# Patient Record
Sex: MALE | Race: BLACK OR AFRICAN AMERICAN | NOT HISPANIC OR LATINO | Employment: OTHER | URBAN - METROPOLITAN AREA
[De-identification: names, ages, dates, MRNs, and addresses within clinical notes are randomized per-mention and may not be internally consistent; named-entity substitution may affect disease eponyms.]

---

## 2019-06-20 PROBLEM — I63.9 CEREBROVASCULAR ACCIDENT (CVA): Status: ACTIVE | Noted: 2019-06-20

## 2019-06-20 PROBLEM — I73.9 SMALL VESSEL DISEASE: Status: ACTIVE | Noted: 2019-06-20

## 2019-06-20 PROBLEM — B18.1 CHRONIC VIRAL HEPATITIS B WITHOUT DELTA AGENT AND WITHOUT COMA: Status: ACTIVE | Noted: 2019-02-13

## 2019-06-20 PROBLEM — R47.9 DIFFICULTY SPEAKING: Status: ACTIVE | Noted: 2019-06-20

## 2019-06-20 PROBLEM — R10.13 EPIGASTRIC PAIN: Status: ACTIVE | Noted: 2019-06-20

## 2019-06-24 PROBLEM — R20.0 NUMBNESS AND TINGLING OF BOTH FEET: Status: ACTIVE | Noted: 2019-06-24

## 2019-06-24 PROBLEM — F17.200 SMOKER: Chronic | Status: ACTIVE | Noted: 2019-06-24

## 2019-06-24 PROBLEM — R20.2 NUMBNESS AND TINGLING OF BOTH FEET: Status: ACTIVE | Noted: 2019-06-24

## 2019-09-13 PROBLEM — R22.9 LOCALIZED SUPERFICIAL SWELLING, MASS, OR LUMP: Status: ACTIVE | Noted: 2019-09-13

## 2019-09-13 PROBLEM — R07.9 CHEST PAIN: Status: ACTIVE | Noted: 2019-09-13

## 2019-09-13 PROBLEM — R29.90 STROKE-LIKE SYMPTOMS: Status: ACTIVE | Noted: 2019-09-13

## 2019-09-13 PROBLEM — D50.9 IRON DEFICIENCY ANEMIA: Status: ACTIVE | Noted: 2017-11-27

## 2019-09-13 PROBLEM — E86.0 LUETSCHER'S SYNDROME: Status: ACTIVE | Noted: 2019-09-13

## 2019-09-13 PROBLEM — A04.8 H. PYLORI INFECTION: Status: ACTIVE | Noted: 2018-02-14

## 2019-09-13 PROBLEM — R91.8 LUNG MASS: Status: ACTIVE | Noted: 2019-09-04

## 2019-09-13 PROBLEM — K31.89 PORTAL HYPERTENSIVE GASTROPATHY: Status: ACTIVE | Noted: 2018-01-16

## 2019-09-13 PROBLEM — C34.92 ADENOCARCINOMA OF LEFT LUNG: Status: ACTIVE | Noted: 2019-09-13

## 2019-09-13 PROBLEM — Z01.20 ENCOUNTER FOR DENTAL EXAMINATION: Status: ACTIVE | Noted: 2019-09-13

## 2019-09-13 PROBLEM — R68.89 DIFFICULTY WRITING: Status: ACTIVE | Noted: 2019-09-13

## 2019-09-13 PROBLEM — R53.1 WEAKNESS: Status: ACTIVE | Noted: 2019-09-13

## 2019-09-13 PROBLEM — J06.9 ACUTE UPPER RESPIRATORY INFECTION: Status: ACTIVE | Noted: 2019-09-13

## 2019-09-13 PROBLEM — E78.2 MIXED HYPERLIPIDEMIA: Status: ACTIVE | Noted: 2019-09-13

## 2019-09-13 PROBLEM — R31.9 HEMATURIA: Status: ACTIVE | Noted: 2019-09-13

## 2019-09-13 PROBLEM — K76.6 PORTAL HYPERTENSIVE GASTROPATHY: Status: ACTIVE | Noted: 2018-01-16

## 2019-09-13 PROBLEM — K62.5 RECTAL BLEEDING: Status: ACTIVE | Noted: 2019-09-13

## 2019-09-13 PROBLEM — R55 PRE-SYNCOPE: Status: ACTIVE | Noted: 2017-11-21

## 2019-09-13 PROBLEM — R55 VASOVAGAL RESPONSE: Status: ACTIVE | Noted: 2019-02-13

## 2019-10-07 PROBLEM — C34.90 NON-SMALL CELL LUNG CANCER (NSCLC): Status: ACTIVE | Noted: 2019-09-19

## 2020-01-23 PROBLEM — Z51.12 ENCOUNTER FOR ANTINEOPLASTIC IMMUNOTHERAPY: Status: ACTIVE | Noted: 2020-01-23

## 2020-02-13 PROBLEM — F17.200 SMOKER: Chronic | Status: RESOLVED | Noted: 2019-06-24 | Resolved: 2020-02-13

## 2020-06-19 ENCOUNTER — TELEPHONE (OUTPATIENT)
Dept: PHARMACY | Facility: CLINIC | Age: 65
End: 2020-06-19

## 2020-06-19 NOTE — TELEPHONE ENCOUNTER
LVM for callback to inform patient that [facility] received prescription for Entecavir and benefits investigation is required.  [facility] will be back in touch once insurance determination is received.

## 2020-06-22 ENCOUNTER — TELEPHONE (OUTPATIENT)
Dept: PHARMACY | Facility: CLINIC | Age: 65
End: 2020-06-22

## 2020-06-22 NOTE — TELEPHONE ENCOUNTER
Initial [facility] Medication Consult: Entecavir      Confirmed 2 patient identifiers - name and . Patient received consultation on entecavir over the phone . Entecavir 0.5 mg will be shipped on  to arrive at patient's home on  via [employer]. Patient will start entecavir on . $0 copay. Address confirmed.      Entecavir 0.5 mg- Take one tablet by mouth once DAILY ON AN EMPTY STOMACH.  Counseling was reviewed:              1. Patient MUST take entecavir at the SAME day every day ON AN EMPTY STOMACH.              2. Side effects include, but not limited to: headache, cough, upset stomach/vomiting, stomach pains, fatigue, back pain.              Headache: Patient may treat with OTC remedies. If Tylenol is used, dose should  not exceed 2000mg per day.              3. Medication list reviewed. No DDIs or allergies noted. Patient MUST contact myself or provider prior to starting any new OTC, herbal, or prescription drugs to avoid potential DDIs.      Discussed the importance of staying well hydrated while on therapy. Compliance stressed - patient to take missed doses as soon as remembered, but NOT to take 2 doses in one day. Patient will report questions or concerns to myself or practitioner. Patient verbalizes understanding. Patient plans to start entecavir on . Consultation included: indication; goals of treatment; administration; storage and handling; side effects; how to handle side effects; the importance of compliance; how to handle missed doses; the importance of laboratory monitoring; the importance of keeping all follow up appointments.  Patient understands to report any medication changes to [facility] and provider. All questions answered and addressed to patients satisfaction. I will f/u with her in 1 week from start, [facility] to contact patient in 3 weeks for refills.     Lauren JeffriesD, [facility]  Clinical Pharmacist   [facility]  Phone: [phone]

## 2020-07-09 PROBLEM — R53.1 WEAKNESS: Status: RESOLVED | Noted: 2019-09-13 | Resolved: 2020-07-09

## 2020-07-09 PROBLEM — R20.2 NUMBNESS AND TINGLING OF BOTH FEET: Status: RESOLVED | Noted: 2019-06-24 | Resolved: 2020-07-09

## 2020-07-09 PROBLEM — R07.9 CHEST PAIN: Status: RESOLVED | Noted: 2019-09-13 | Resolved: 2020-07-09

## 2020-07-09 PROBLEM — R55 PRE-SYNCOPE: Status: RESOLVED | Noted: 2017-11-21 | Resolved: 2020-07-09

## 2020-07-09 PROBLEM — R10.13 EPIGASTRIC PAIN: Status: RESOLVED | Noted: 2019-06-20 | Resolved: 2020-07-09

## 2020-07-09 PROBLEM — R55 VASOVAGAL RESPONSE: Status: RESOLVED | Noted: 2019-02-13 | Resolved: 2020-07-09

## 2020-07-09 PROBLEM — R20.0 NUMBNESS AND TINGLING OF BOTH FEET: Status: RESOLVED | Noted: 2019-06-24 | Resolved: 2020-07-09

## 2020-07-09 PROBLEM — K62.5 RECTAL BLEEDING: Status: RESOLVED | Noted: 2019-09-13 | Resolved: 2020-07-09

## 2020-07-09 PROBLEM — R22.9 LOCALIZED SUPERFICIAL SWELLING, MASS, OR LUMP: Status: RESOLVED | Noted: 2019-09-13 | Resolved: 2020-07-09

## 2020-07-15 PROBLEM — K63.5 COLON POLYP: Status: ACTIVE | Noted: 2020-07-15

## 2020-08-17 ENCOUNTER — TELEPHONE (OUTPATIENT)
Dept: PHARMACY | Facility: CLINIC | Age: 65
End: 2020-08-17

## 2020-08-24 ENCOUNTER — TELEPHONE (OUTPATIENT)
Dept: PHARMACY | Facility: CLINIC | Age: 65
End: 2020-08-24

## 2020-08-25 ENCOUNTER — TELEPHONE (OUTPATIENT)
Dept: PHARMACY | Facility: CLINIC | Age: 65
End: 2020-08-25
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
## 2020-09-23 ENCOUNTER — TELEPHONE (OUTPATIENT)
Dept: PHARMACY | Facility: CLINIC | Age: 65
End: 2020-09-23

## 2020-10-23 ENCOUNTER — TELEPHONE (OUTPATIENT)
Dept: PHARMACY | Facility: CLINIC | Age: 65
End: 2020-10-23

## 2020-11-27 ENCOUNTER — PATIENT MESSAGE (OUTPATIENT)
Dept: PHARMACY | Facility: CLINIC | Age: 65
End: 2020-11-27

## 2020-12-03 ENCOUNTER — SPECIALTY PHARMACY (OUTPATIENT)
Dept: PHARMACY | Facility: CLINIC | Age: 65
End: 2020-12-03

## 2020-12-03 PROBLEM — J06.9 ACUTE UPPER RESPIRATORY INFECTION: Status: RESOLVED | Noted: 2019-09-13 | Resolved: 2020-12-03

## 2020-12-03 PROBLEM — R31.9 HEMATURIA: Status: RESOLVED | Noted: 2019-09-13 | Resolved: 2020-12-03

## 2020-12-03 NOTE — TELEPHONE ENCOUNTER
Specialty Pharmacy - Refill Coordination    Specialty Medication Orders Linked to Encounter      Most Recent Value   Medication #1  entecavir (BARACLUDE) 0.5 MG Tab (Order#[id number], Rx#[id number])          Refill Questions - Documented Responses      Most Recent Value   Relationship to patient of person spoken to?  Self   HIPAA/medical authority confirmed?  Yes   Any changes in contact preferences or allowed representatives?  No   Has the patient had any insurance changes?  No   Has the patient had any changes to specialty medication, dose, or instructions?  No   Has the patient started taking any new medications, herbals, or supplements?  No   Has the patient been diagnosed with any new medical conditions?  No   Does the patient have any new allergies to medications or foods?  No   Does the patient have any concerns about side effects?  No   Can the patient store medication/sharps container properly (at the correct temperature, away from children/pets, etc.)?  Yes   Can the patient call emergency services (911) in the event of an emergency?  Yes   Does the patient have any concerns or questions about taking or administering this medication as prescribed?  No   How many doses did the patient miss in the past 4 weeks or since the last fill?  0   How many doses does the patient have on hand?  7   How many days does the patient report on hand quantity will last?  7   Does the number of doses/days supply remaining match pharmacy expected amounts?  Yes   Does the patient feel that this medication is effective?  Yes   How will the patient receive the medication?  Mail   When does the patient need to receive the medication?  12/10/20   Shipping Address  Home   Address in [facility] confirmed and updated if neccessary?  Yes   Expected Copay ($)  0   Is the patient able to afford the medication copay?  Yes   Payment Method  zero copay   Days supply of Refill  30   Would patient like to speak to a pharmacist?  No    Do you want to trigger an intervention?  No   Do you want to trigger an additional referral task?  No   Refill activity completed?  Yes   Refill activity plan  Refill scheduled   Shipment/Pickup Date:  12/08/20          Current Outpatient Medications   Medication Sig    albuterol (PROVENTIL/VENTOLIN HFA) 90 mcg/actuation inhaler Inhale 2 puffs into the lungs every 6 (six) hours as needed for Wheezing. Rescue    albuterol-ipratropium (DUO-NEB) 2.5 mg-0.5 mg/3 mL nebulizer solution Take 3 mLs by nebulization every 6 (six) hours as needed for Wheezing. Rescue    ascorbic acid, vitamin C, (VITAMIN C) 500 MG tablet Take 500 mg by mouth.    aspirin 81 MG Chew Take 1 tablet by mouth.    entecavir (BARACLUDE) 0.5 MG Tab Take 1 tablet (0.5 mg total) by mouth once daily.    famotidine (PEPCID) 20 MG tablet Take 20 mg by mouth once daily.    gabapentin (NEURONTIN) 300 MG capsule Take 1 capsule by mouth every evening.    losartan (COZAAR) 50 MG tablet Take 1 tablet (50 mg total) by mouth before breakfast.    nebulizer and compressor Michelle Use nebulized for breathing treatment Q6hr prn for wheezing    ondansetron (ZOFRAN-ODT) 8 MG TbDL Take 1 tablet (8 mg total) by mouth every 8 (eight) hours as needed.   Last reviewed on 12/3/2020 10:57 AM by Pal Burger    Review of patient's allergies indicates:   Allergen Reactions    Orange juice Rash    Last reviewed on  12/3/2020 10:57 AM by Pal Burger      Tasks added this encounter   No tasks added.   Tasks due within next 3 months   12/12/2020 - Clinical - Follow Up Assesement (180 day)  11/25/2020 - Refill Call     Pal Burger  [facility]  [address] [place] [id number]  Phone: [phone]  Fax: [phone]

## 2020-12-31 ENCOUNTER — SPECIALTY PHARMACY (OUTPATIENT)
Dept: PHARMACY | Facility: CLINIC | Age: 65
End: 2020-12-31

## 2021-01-22 PROBLEM — R23.3 PETECHIAL RASH: Status: ACTIVE | Noted: 2021-01-22

## 2021-02-01 ENCOUNTER — SPECIALTY PHARMACY (OUTPATIENT)
Dept: PHARMACY | Facility: CLINIC | Age: 66
End: 2021-02-01

## 2021-03-02 ENCOUNTER — SPECIALTY PHARMACY (OUTPATIENT)
Dept: PHARMACY | Facility: CLINIC | Age: 66
End: 2021-03-02

## 2021-03-11 ENCOUNTER — PATIENT MESSAGE (OUTPATIENT)
Dept: PHARMACY | Facility: CLINIC | Age: 66
End: 2021-03-11

## 2021-04-16 ENCOUNTER — SPECIALTY PHARMACY (OUTPATIENT)
Dept: PHARMACY | Facility: CLINIC | Age: 66
End: 2021-04-16

## 2021-05-12 ENCOUNTER — PATIENT MESSAGE (OUTPATIENT)
Dept: RESEARCH | Facility: HOSPITAL | Age: 66
End: 2021-05-12

## 2021-05-17 ENCOUNTER — PATIENT MESSAGE (OUTPATIENT)
Dept: PHARMACY | Facility: CLINIC | Age: 66
End: 2021-05-17

## 2021-05-18 ENCOUNTER — SPECIALTY PHARMACY (OUTPATIENT)
Dept: PHARMACY | Facility: CLINIC | Age: 66
End: 2021-05-18

## 2021-06-15 ENCOUNTER — SPECIALTY PHARMACY (OUTPATIENT)
Dept: PHARMACY | Facility: CLINIC | Age: 66
End: 2021-06-15

## 2021-07-07 PROBLEM — R05.9 COUGH: Status: ACTIVE | Noted: 2021-07-07

## 2021-07-07 PROBLEM — E03.9 HYPOTHYROIDISM: Status: ACTIVE | Noted: 2021-07-07

## 2021-07-13 ENCOUNTER — PATIENT MESSAGE (OUTPATIENT)
Dept: PHARMACY | Facility: CLINIC | Age: 66
End: 2021-07-13

## 2021-07-13 ENCOUNTER — SPECIALTY PHARMACY (OUTPATIENT)
Dept: PHARMACY | Facility: CLINIC | Age: 66
End: 2021-07-13

## 2021-07-26 ENCOUNTER — SPECIALTY PHARMACY (OUTPATIENT)
Dept: PHARMACY | Facility: CLINIC | Age: 66
End: 2021-07-26

## 2021-08-21 ENCOUNTER — PATIENT MESSAGE (OUTPATIENT)
Dept: PHARMACY | Facility: CLINIC | Age: 66
End: 2021-08-21

## 2021-08-26 ENCOUNTER — SPECIALTY PHARMACY (OUTPATIENT)
Dept: PHARMACY | Facility: CLINIC | Age: 66
End: 2021-08-26

## 2021-09-23 ENCOUNTER — PATIENT MESSAGE (OUTPATIENT)
Dept: PHARMACY | Facility: CLINIC | Age: 66
End: 2021-09-23

## 2021-09-29 ENCOUNTER — SPECIALTY PHARMACY (OUTPATIENT)
Dept: PHARMACY | Facility: CLINIC | Age: 66
End: 2021-09-29

## 2021-10-23 ENCOUNTER — SPECIALTY PHARMACY (OUTPATIENT)
Dept: PHARMACY | Facility: CLINIC | Age: 66
End: 2021-10-23

## 2021-11-24 ENCOUNTER — SPECIALTY PHARMACY (OUTPATIENT)
Dept: PHARMACY | Facility: CLINIC | Age: 66
End: 2021-11-24

## 2021-12-24 ENCOUNTER — SPECIALTY PHARMACY (OUTPATIENT)
Dept: PHARMACY | Facility: CLINIC | Age: 66
End: 2021-12-24
Payer: MEDICAID

## 2022-01-24 ENCOUNTER — SPECIALTY PHARMACY (OUTPATIENT)
Dept: PHARMACY | Facility: CLINIC | Age: 67
End: 2022-01-24
Payer: MEDICAID

## 2022-01-24 NOTE — TELEPHONE ENCOUNTER
Specialty Pharmacy - Refill Coordination    Specialty Medication Orders Linked to Encounter    Flowsheet Row Most Recent Value   Medication #1 entecavir (BARACLUDE) 0.5 MG Tab (Order#[id number], Rx#[id number])          Refill Questions - Documented Responses    Flowsheet Row Most Recent Value   Patient Availability and HIPAA Verification    Does patient want to proceed with activity? Yes   HIPAA/medical authority confirmed? Yes   Relationship to patient of person spoken to? Self   Refill Screening Questions    Changes to allergies? No   Changes to medications? No   New conditions since last clinic visit? No   Unplanned office visit, urgent care, ED, or hospital admission in the last 4 weeks? No   How does patient/caregiver feel medication is working? Good   Financial problems or insurance changes? No   How many doses of your specialty medications were missed in the last 4 weeks? 0   Would patient like to speak to a pharmacist? No   When does the patient need to receive the medication? 02/02/22   Refill Delivery Questions    How will the patient receive the medication? Mail   When does the patient need to receive the medication? 02/02/22   Shipping Address Home   Address in [facility] confirmed and updated if neccessary? Yes   Expected Copay ($) 0   Is the patient able to afford the medication copay? Yes   Payment Method zero copay   Days supply of Refill 30   Supplies needed? No supplies needed   Refill activity completed? Yes   Refill activity plan Refill scheduled   Shipment/Pickup Date: 02/02/22          Current Outpatient Medications   Medication Sig    albuterol (PROVENTIL/VENTOLIN HFA) 90 mcg/actuation inhaler Inhale 2 puffs into the lungs every 6 (six) hours as needed for Wheezing. Rescue    albuterol-ipratropium (DUO-NEB) 2.5 mg-0.5 mg/3 mL nebulizer solution Take 3 mLs by nebulization every 6 (six) hours as needed for Wheezing. Rescue    ascorbic acid, vitamin C, (VITAMIN C) 500 MG tablet Take 500  mg by mouth.    aspirin 81 MG Chew Take 1 tablet (81 mg total) by mouth once daily.    entecavir (BARACLUDE) 0.5 MG Tab Take 1 tablet (0.5 mg total) by mouth once daily.    entecavir (BARACLUDE) 0.5 MG Tab Take 1 tablet (0.5 mg) by mouth once daily.    levothyroxine (EUTHYROX) 50 MCG tablet Take 1 tablet (50 mcg total) by mouth before breakfast.    losartan (COZAAR) 50 MG tablet Take 1.5 tablets (75 mg total) by mouth before breakfast.    nebulizer and compressor Michelle Use nebulized for breathing treatment Q6hr prn for wheezing    pantoprazole (PROTONIX) 40 MG tablet Take 1 tablet (40 mg total) by mouth once daily.    SUPREP BOWEL PREP KIT 17.5-3.13-1.6 gram SolR Take by mouth.   Last reviewed on 1/19/2022  9:57 AM by Na Mullins NP    Review of patient's allergies indicates:   Allergen Reactions    Orange juice Rash    Last reviewed on  1/19/2022 9:57 AM by Na Mullins      Tasks added this encounter   2/25/2022 - Refill Call (Auto Added)   Tasks due within next 3 months   No tasks due.     [facility]  [address] [place] [id number]  Phone: [phone]  Fax: [phone]

## 2022-02-25 ENCOUNTER — SPECIALTY PHARMACY (OUTPATIENT)
Dept: PHARMACY | Facility: CLINIC | Age: 67
End: 2022-02-25
Payer: MEDICAID

## 2022-02-25 NOTE — TELEPHONE ENCOUNTER
Specialty Pharmacy - Refill Coordination    Specialty Medication Orders Linked to Encounter    Flowsheet Row Most Recent Value   Medication #1 entecavir (BARACLUDE) 0.5 MG Tab (Order#[id number], Rx#[id number])          Refill Questions - Documented Responses    Flowsheet Row Most Recent Value   Patient Availability and HIPAA Verification    Does patient want to proceed with activity? Yes   HIPAA/medical authority confirmed? Yes   Relationship to patient of person spoken to? Self   Refill Screening Questions    Changes to allergies? No   Changes to medications? No   New conditions since last clinic visit? No   Unplanned office visit, urgent care, ED, or hospital admission in the last 4 weeks? No   How does patient/caregiver feel medication is working? Good   Financial problems or insurance changes? No   How many doses of your specialty medications were missed in the last 4 weeks? 0   Would patient like to speak to a pharmacist? No   When does the patient need to receive the medication? 03/04/22   Refill Delivery Questions    How will the patient receive the medication? Mail   When does the patient need to receive the medication? 03/04/22   Shipping Address Home   Address in [facility] confirmed and updated if neccessary? Yes   Expected Copay ($) 0   Is the patient able to afford the medication copay? Yes   Payment Method zero copay   Days supply of Refill 30   Supplies needed? No supplies needed   Refill activity completed? Yes   Refill activity plan Refill scheduled   Shipment/Pickup Date: 03/02/22          Current Outpatient Medications   Medication Sig    albuterol (PROVENTIL/VENTOLIN HFA) 90 mcg/actuation inhaler Inhale 2 puffs into the lungs every 6 (six) hours as needed for Wheezing. Rescue    albuterol-ipratropium (DUO-NEB) 2.5 mg-0.5 mg/3 mL nebulizer solution Take 3 mLs by nebulization every 6 (six) hours as needed for Wheezing. Rescue    ascorbic acid, vitamin C, (VITAMIN C) 500 MG tablet Take 500  mg by mouth.    aspirin 81 MG Chew Take 1 tablet (81 mg total) by mouth once daily.    entecavir (BARACLUDE) 0.5 MG Tab Take 1 tablet (0.5 mg total) by mouth once daily.    entecavir (BARACLUDE) 0.5 MG Tab Take 1 tablet (0.5 mg) by mouth once daily.    levothyroxine (EUTHYROX) 50 MCG tablet Take 1 tablet (50 mcg total) by mouth before breakfast.    losartan (COZAAR) 50 MG tablet Take 1.5 tablets (75 mg total) by mouth before breakfast.    nebulizer and compressor Michelle Use nebulized for breathing treatment Q6hr prn for wheezing    pantoprazole (PROTONIX) 40 MG tablet Take 1 tablet (40 mg total) by mouth once daily.    SUPREP BOWEL PREP KIT 17.5-3.13-1.6 gram SolR Take by mouth.   Last reviewed on 1/19/2022  9:57 AM by Na Mullins NP    Review of patient's allergies indicates:   Allergen Reactions    Orange juice Rash    Last reviewed on  1/19/2022 9:57 AM by Na Mullins      Tasks added this encounter   3/27/2022 - Refill Call (Auto Added)   Tasks due within next 3 months   No tasks due.     [facility]  [address] [place] [id number]  Phone: [phone]  Fax: [phone]

## 2022-03-28 ENCOUNTER — PATIENT MESSAGE (OUTPATIENT)
Dept: PHARMACY | Facility: CLINIC | Age: 67
End: 2022-03-28
Payer: MEDICAID

## 2022-03-28 ENCOUNTER — SPECIALTY PHARMACY (OUTPATIENT)
Dept: PHARMACY | Facility: CLINIC | Age: 67
End: 2022-03-28
Payer: MEDICAID

## 2022-04-05 NOTE — TELEPHONE ENCOUNTER
Specialty Pharmacy - Refill Coordination    Specialty Medication Orders Linked to Encounter    Flowsheet Row Most Recent Value   Medication #1 entecavir (BARACLUDE) 0.5 MG Tab (Order#[id number], Rx#[id number])          Refill Questions - Documented Responses    Flowsheet Row Most Recent Value   Patient Availability and HIPAA Verification    Does patient want to proceed with activity? Yes   HIPAA/medical authority confirmed? Yes   Relationship to patient of person spoken to? Self   Refill Screening Questions    Changes to allergies? No   Changes to medications? No   New conditions since last clinic visit? No   Unplanned office visit, urgent care, ED, or hospital admission in the last 4 weeks? No   How does patient/caregiver feel medication is working? Good   Financial problems or insurance changes? No   How many doses of your specialty medications were missed in the last 4 weeks? 0   Would patient like to speak to a pharmacist? No   When does the patient need to receive the medication? 04/12/22   Refill Delivery Questions    How will the patient receive the medication? Mail   When does the patient need to receive the medication? 04/12/22   Shipping Address Home   Address in [facility] confirmed and updated if neccessary? Yes   Expected Copay ($) 0   Is the patient able to afford the medication copay? Yes   Payment Method zero copay   Days supply of Refill 30   Supplies needed? No supplies needed   Refill activity completed? Yes   Refill activity plan Refill scheduled   Shipment/Pickup Date: 04/07/22          Current Outpatient Medications   Medication Sig    albuterol (PROVENTIL/VENTOLIN HFA) 90 mcg/actuation inhaler Inhale 2 puffs into the lungs every 6 (six) hours as needed for Wheezing. Rescue    albuterol-ipratropium (DUO-NEB) 2.5 mg-0.5 mg/3 mL nebulizer solution Take 3 mLs by nebulization every 6 (six) hours as needed for Wheezing. Rescue    ascorbic acid, vitamin C, (VITAMIN C) 500 MG tablet Take 500  mg by mouth.    aspirin 81 MG Chew Take 1 tablet (81 mg total) by mouth once daily.    clindamycin (CLEOCIN) 300 MG capsule Take 300 mg by mouth 3 (three) times daily.    entecavir (BARACLUDE) 0.5 MG Tab Take 1 tablet (0.5 mg total) by mouth once daily.    entecavir (BARACLUDE) 0.5 MG Tab Take 1 tablet (0.5 mg) by mouth once daily.    levothyroxine (EUTHYROX) 50 MCG tablet Take 1 tablet (50 mcg total) by mouth before breakfast.    losartan (COZAAR) 50 MG tablet Take 1.5 tablets (75 mg total) by mouth before breakfast.    nebulizer and compressor Michelle Use nebulized for breathing treatment Q6hr prn for wheezing    pantoprazole (PROTONIX) 40 MG tablet Take 1 tablet (40 mg total) by mouth once daily.    SUPREP BOWEL PREP KIT 17.5-3.13-1.6 gram SolR Take by mouth.   Last reviewed on 3/24/2022  2:17 PM by Steffanie Pete MA    Review of patient's allergies indicates:   Allergen Reactions    Orange juice Rash    Last reviewed on  3/24/2022 2:16 PM by Steffanie Pete      Tasks added this encounter   5/5/2022 - Refill Call (Auto Added)   Tasks due within next 3 months   No tasks due.     Josi Chaparro, PharmD  [facility]  [address] [place] [id number]  Phone: [phone]  Fax: [phone]

## 2022-04-18 ENCOUNTER — PATIENT MESSAGE (OUTPATIENT)
Dept: ADMINISTRATIVE | Facility: OTHER | Age: 67
End: 2022-04-18
Payer: MEDICAID

## 2022-05-05 ENCOUNTER — SPECIALTY PHARMACY (OUTPATIENT)
Dept: PHARMACY | Facility: CLINIC | Age: 67
End: 2022-05-05
Payer: MEDICAID

## 2022-05-05 ENCOUNTER — PATIENT MESSAGE (OUTPATIENT)
Dept: PHARMACY | Facility: CLINIC | Age: 67
End: 2022-05-05
Payer: MEDICAID

## 2022-05-05 NOTE — TELEPHONE ENCOUNTER
Specialty Pharmacy - Refill Coordination    Specialty Medication Orders Linked to Encounter    Flowsheet Row Most Recent Value   Medication #1 entecavir (BARACLUDE) 0.5 MG Tab (Order#[id number], Rx#[id number])          Refill Questions - Documented Responses    Flowsheet Row Most Recent Value   Patient Availability and HIPAA Verification    Does patient want to proceed with activity? Yes   HIPAA/medical authority confirmed? Yes   Relationship to patient of person spoken to? Self   Refill Screening Questions    Changes to allergies? No   Changes to medications? No   New conditions since last clinic visit? No   Unplanned office visit, urgent care, ED, or hospital admission in the last 4 weeks? No   How does patient/caregiver feel medication is working? Good   Financial problems or insurance changes? No   How many doses of your specialty medications were missed in the last 4 weeks? 0   Would patient like to speak to a pharmacist? No   When does the patient need to receive the medication? 05/15/22   Refill Delivery Questions    How will the patient receive the medication? Mail   When does the patient need to receive the medication? 05/15/22   Shipping Address Home   Address in [facility] confirmed and updated if neccessary? Yes   Expected Copay ($) 0   Is the patient able to afford the medication copay? Yes   Payment Method zero copay   Days supply of Refill 30   Supplies needed? No supplies needed   Refill activity completed? Yes   Refill activity plan Refill scheduled   Shipment/Pickup Date: 05/09/22          Current Outpatient Medications   Medication Sig    albuterol (PROVENTIL/VENTOLIN HFA) 90 mcg/actuation inhaler Inhale 2 puffs into the lungs every 6 (six) hours as needed for Wheezing. Rescue    albuterol-ipratropium (DUO-NEB) 2.5 mg-0.5 mg/3 mL nebulizer solution Take 3 mLs by nebulization every 6 (six) hours as needed for Wheezing. Rescue    ascorbic acid, vitamin C, (VITAMIN C) 500 MG tablet Take 500  mg by mouth.    aspirin 81 MG Chew Take 1 tablet (81 mg total) by mouth once daily.    clindamycin (CLEOCIN) 300 MG capsule Take 300 mg by mouth 3 (three) times daily.    entecavir (BARACLUDE) 0.5 MG Tab Take 1 tablet (0.5 mg total) by mouth once daily.    entecavir (BARACLUDE) 0.5 MG Tab Take 1 tablet (0.5 mg) by mouth once daily.    levothyroxine (EUTHYROX) 50 MCG tablet Take 1 tablet (50 mcg total) by mouth before breakfast.    losartan (COZAAR) 50 MG tablet Take 1.5 tablets (75 mg total) by mouth before breakfast.    nebulizer and compressor Michelle Use nebulized for breathing treatment Q6hr prn for wheezing    pantoprazole (PROTONIX) 40 MG tablet Take 1 tablet (40 mg total) by mouth once daily.    SUPREP BOWEL PREP KIT 17.5-3.13-1.6 gram SolR Take by mouth.   Last reviewed on 3/24/2022  2:17 PM by Steffanie Pete MA    Review of patient's allergies indicates:   Allergen Reactions    Orange juice Rash    Last reviewed on  3/24/2022 2:16 PM by Steffanie Pete      Tasks added this encounter   6/7/2022 - Refill Call (Auto Added)   Tasks due within next 3 months   No tasks due.     Regina Benson, PharmD  [facility]  [address] [place] [id number]  Phone: [phone]  Fax: [phone]

## 2022-06-07 ENCOUNTER — SPECIALTY PHARMACY (OUTPATIENT)
Dept: PHARMACY | Facility: CLINIC | Age: 67
End: 2022-06-07
Payer: MEDICAID

## 2022-06-07 NOTE — TELEPHONE ENCOUNTER
Specialty Pharmacy - Refill Coordination    Specialty Medication Orders Linked to Encounter    Flowsheet Row Most Recent Value   Medication #1 entecavir (BARACLUDE) 0.5 MG Tab (Order#[id number], Rx#[id number])          Refill Questions - Documented Responses    Flowsheet Row Most Recent Value   Patient Availability and HIPAA Verification    Does patient want to proceed with activity? Yes   HIPAA/medical authority confirmed? Yes   Relationship to patient of person spoken to? Self   Refill Screening Questions    Changes to allergies? No   Changes to medications? No   New conditions since last clinic visit? No   Unplanned office visit, urgent care, ED, or hospital admission in the last 4 weeks? No   How does patient/caregiver feel medication is working? Good   Financial problems or insurance changes? No   How many doses of your specialty medications were missed in the last 4 weeks? 0   Would patient like to speak to a pharmacist? No   When does the patient need to receive the medication? 06/12/22   Refill Delivery Questions    How will the patient receive the medication? Mail   When does the patient need to receive the medication? 06/12/22   Shipping Address Home   Address in [facility] confirmed and updated if neccessary? Yes   Expected Copay ($) 0   Is the patient able to afford the medication copay? Yes   Payment Method zero copay   Days supply of Refill 30   Supplies needed? No supplies needed   Refill activity completed? Yes   Refill activity plan Refill scheduled   Shipment/Pickup Date: 06/09/22          Current Outpatient Medications   Medication Sig    albuterol (PROVENTIL/VENTOLIN HFA) 90 mcg/actuation inhaler Inhale 2 puffs into the lungs every 6 (six) hours as needed for Wheezing. Rescue    albuterol-ipratropium (DUO-NEB) 2.5 mg-0.5 mg/3 mL nebulizer solution Take 3 mLs by nebulization every 6 (six) hours as needed for Wheezing. Rescue    ascorbic acid, vitamin C, (VITAMIN C) 500 MG tablet Take 500  mg by mouth.    aspirin 81 MG Chew Take 1 tablet (81 mg total) by mouth once daily.    clindamycin (CLEOCIN) 300 MG capsule Take 300 mg by mouth 3 (three) times daily.    entecavir (BARACLUDE) 0.5 MG Tab Take 1 tablet (0.5 mg total) by mouth once daily.    entecavir (BARACLUDE) 0.5 MG Tab Take 1 tablet (0.5 mg) by mouth once daily.    levothyroxine (EUTHYROX) 50 MCG tablet Take 1 tablet (50 mcg total) by mouth before breakfast.    losartan (COZAAR) 50 MG tablet Take 1.5 tablets (75 mg total) by mouth before breakfast.    nebulizer and compressor Michelle Use nebulized for breathing treatment Q6hr prn for wheezing    pantoprazole (PROTONIX) 40 MG tablet Take 1 tablet (40 mg total) by mouth once daily.    SUPREP BOWEL PREP KIT 17.5-3.13-1.6 gram SolR Take by mouth.   Last reviewed on 6/2/2022  1:03 PM by Sigrid Soler RN    Review of patient's allergies indicates:   Allergen Reactions    Orange juice Rash    Last reviewed on  6/2/2022 1:03 PM by Sigrid Soler      Tasks added this encounter   7/5/2022 - Refill Call (Auto Added)   Tasks due within next 3 months   No tasks due.     Sofi Cannon  [facility]  [address] [place] [id number]  Phone: [phone]  Fax: [phone]

## 2022-07-05 ENCOUNTER — SPECIALTY PHARMACY (OUTPATIENT)
Dept: PHARMACY | Facility: CLINIC | Age: 67
End: 2022-07-05
Payer: MEDICAID

## 2022-07-05 NOTE — TELEPHONE ENCOUNTER
Specialty Pharmacy - Refill Coordination    Specialty Medication Orders Linked to Encounter    Flowsheet Row Most Recent Value   Medication #1 entecavir (BARACLUDE) 0.5 MG Tab (Order#[id number], Rx#[id number])          Refill Questions - Documented Responses    Flowsheet Row Most Recent Value   Patient Availability and HIPAA Verification    Does patient want to proceed with activity? Yes   HIPAA/medical authority confirmed? Yes   Relationship to patient of person spoken to? Self   Refill Screening Questions    Changes to allergies? No   Changes to medications? No   New conditions since last clinic visit? No   Unplanned office visit, urgent care, ED, or hospital admission in the last 4 weeks? No   How does patient/caregiver feel medication is working? Good   Financial problems or insurance changes? No   How many doses of your specialty medications were missed in the last 4 weeks? 0   Would patient like to speak to a pharmacist? No   When does the patient need to receive the medication? 07/12/22   Refill Delivery Questions    How will the patient receive the medication? Mail   When does the patient need to receive the medication? 07/12/22   Shipping Address Home   Address in [facility] confirmed and updated if neccessary? Yes   Expected Copay ($) 0   Is the patient able to afford the medication copay? Yes   Payment Method zero copay   Days supply of Refill 30   Supplies needed? No supplies needed   Refill activity completed? Yes   Refill activity plan Refill scheduled   Shipment/Pickup Date: 07/11/22          Current Outpatient Medications   Medication Sig    albuterol (PROVENTIL/VENTOLIN HFA) 90 mcg/actuation inhaler Inhale 2 puffs into the lungs every 6 (six) hours as needed for Wheezing. Rescue    albuterol-ipratropium (DUO-NEB) 2.5 mg-0.5 mg/3 mL nebulizer solution Take 3 mLs by nebulization every 6 (six) hours as needed for Wheezing. Rescue    ascorbic acid, vitamin C, (VITAMIN C) 500 MG tablet Take 500  mg by mouth.    aspirin 81 MG Chew Take 1 tablet (81 mg total) by mouth once daily.    clindamycin (CLEOCIN) 300 MG capsule Take 300 mg by mouth 3 (three) times daily.    entecavir (BARACLUDE) 0.5 MG Tab Take 1 tablet (0.5 mg total) by mouth once daily.    entecavir (BARACLUDE) 0.5 MG Tab Take 1 tablet (0.5 mg) by mouth once daily.    levothyroxine (EUTHYROX) 50 MCG tablet Take 1 tablet (50 mcg total) by mouth before breakfast.    losartan (COZAAR) 50 MG tablet Take 1.5 tablets (75 mg total) by mouth before breakfast.    nebulizer and compressor Michelle Use nebulized for breathing treatment Q6hr prn for wheezing    pantoprazole (PROTONIX) 40 MG tablet Take 1 tablet (40 mg total) by mouth once daily.    SUPREP BOWEL PREP KIT 17.5-3.13-1.6 gram SolR Take by mouth.   Last reviewed on 6/2/2022  1:03 PM by Sigrid Soler RN    Review of patient's allergies indicates:   Allergen Reactions    Orange juice Rash    Last reviewed on  6/2/2022 1:03 PM by Sigrid Soler      Tasks added this encounter   8/4/2022 - Refill Call (Auto Added)   Tasks due within next 3 months   No tasks due.     Sofi Cannon  [facility] - Specialty Pharmacy  [address] [place] [address]  Phone: [phone]  Fax: [phone]

## 2022-08-04 ENCOUNTER — PATIENT MESSAGE (OUTPATIENT)
Dept: PHARMACY | Facility: CLINIC | Age: 67
End: 2022-08-04
Payer: MEDICAID

## 2022-08-08 ENCOUNTER — SPECIALTY PHARMACY (OUTPATIENT)
Dept: PHARMACY | Facility: CLINIC | Age: 67
End: 2022-08-08
Payer: MEDICAID

## 2022-08-08 NOTE — TELEPHONE ENCOUNTER
Specialty Pharmacy - Refill Coordination    Specialty Medication Orders Linked to Encounter    Flowsheet Row Most Recent Value   Medication #1 entecavir (BARACLUDE) 0.5 MG Tab (Order#[id number], Rx#[id number])        Amlodipine is appropriate to co-administer with Entecavir and other medications. No DDIs.     Refill Questions - Documented Responses    Flowsheet Row Most Recent Value   Patient Availability and HIPAA Verification    Does patient want to proceed with activity? Yes   HIPAA/medical authority confirmed? Yes   Relationship to patient of person spoken to? Self   Refill Screening Questions    Changes to allergies? No   Changes to medications? Yes  [amlodipine 5mg - for hbp]   New conditions since last clinic visit? No   Unplanned office visit, urgent care, ED, or hospital admission in the last 4 weeks? No   How does patient/caregiver feel medication is working? Good   Financial problems or insurance changes? No   How many doses of your specialty medications were missed in the last 4 weeks? 0   Would patient like to speak to a pharmacist? No   When does the patient need to receive the medication? 08/18/22   Refill Delivery Questions    How will the patient receive the medication? Mail   When does the patient need to receive the medication? 08/18/22   Shipping Address Home   Address in [facility] confirmed and updated if neccessary? Yes   Expected Copay ($) 0   Is the patient able to afford the medication copay? Yes   Payment Method zero copay   Days supply of Refill 30   Supplies needed? No supplies needed   Refill activity completed? Yes   Refill activity plan Refill scheduled   Shipment/Pickup Date: 08/09/22          Current Outpatient Medications   Medication Sig    albuterol (PROVENTIL/VENTOLIN HFA) 90 mcg/actuation inhaler Inhale 2 puffs into the lungs every 6 (six) hours as needed for Wheezing. Rescue    albuterol-ipratropium (DUO-NEB) 2.5 mg-0.5 mg/3 mL nebulizer solution Take 3 mLs by  nebulization every 6 (six) hours as needed for Wheezing. Rescue    amLODIPine (NORVASC) 5 MG tablet Take 1 tablet (5 mg total) by mouth once daily.    ascorbic acid, vitamin C, (VITAMIN C) 500 MG tablet Take 500 mg by mouth.    aspirin 81 MG Chew Take 1 tablet (81 mg total) by mouth once daily.    entecavir (BARACLUDE) 0.5 MG Tab Take 1 tablet (0.5 mg) by mouth once daily.    levothyroxine (EUTHYROX) 50 MCG tablet Take 1 tablet (50 mcg total) by mouth before breakfast.    nebulizer and compressor Michelle Use nebulized for breathing treatment Q6hr prn for wheezing    pantoprazole (PROTONIX) 40 MG tablet Take 1 tablet (40 mg total) by mouth once daily.   Last reviewed on 8/4/2022  1:08 PM by Chidi Yoon MD    Review of patient's allergies indicates:   Allergen Reactions    Orange juice Rash    Last reviewed on  8/4/2022 1:08 PM by Chidi Yoon      Tasks added this encounter   9/10/2022 - Refill Call (Auto Added)   Tasks due within next 3 months   No tasks due.     Sera Harp, PharmD  [facility] - Specialty Pharmacy  [address] [place] [address]  Phone: [phone]  Fax: [phone]

## 2022-08-08 NOTE — TELEPHONE ENCOUNTER
Outgoing call: Spoke with patient regarding refill, he has 8 on hand. Patient stated he started a new medication (Amlodipine 5mg) for HBP. Transferring to [facility].

## 2022-09-12 ENCOUNTER — PATIENT MESSAGE (OUTPATIENT)
Dept: PHARMACY | Facility: CLINIC | Age: 67
End: 2022-09-12
Payer: MEDICAID

## 2022-09-15 ENCOUNTER — SPECIALTY PHARMACY (OUTPATIENT)
Dept: PHARMACY | Facility: CLINIC | Age: 67
End: 2022-09-15
Payer: MEDICAID

## 2022-09-15 DIAGNOSIS — B18.1 CHRONIC VIRAL HEPATITIS B WITHOUT DELTA AGENT AND WITHOUT COMA: Primary | ICD-10-CM

## 2022-09-15 NOTE — TELEPHONE ENCOUNTER
Specialty Pharmacy - Refill Coordination    Specialty Medication Orders Linked to Encounter      Flowsheet Row Most Recent Value   Medication #1 entecavir (BARACLUDE) 0.5 MG Tab (Order#[id number], Rx#[id number])            Refill Questions - Documented Responses      Flowsheet Row Most Recent Value   Patient Availability and HIPAA Verification    Does patient want to proceed with activity? Yes   HIPAA/medical authority confirmed? Yes   Relationship to patient of person spoken to? Self   Refill Screening Questions    Changes to allergies? No   Changes to medications? No   New conditions since last clinic visit? No   Unplanned office visit, urgent care, ED, or hospital admission in the last 4 weeks? No   How does patient/caregiver feel medication is working? Good   Financial problems or insurance changes? No   How many doses of your specialty medications were missed in the last 4 weeks? 0   Would patient like to speak to a pharmacist? No   When does the patient need to receive the medication? 09/18/22   Refill Delivery Questions    How will the patient receive the medication? Mail   When does the patient need to receive the medication? 09/18/22   Shipping Address Home   Address in [facility] confirmed and updated if neccessary? Yes   Expected Copay ($) 0   Is the patient able to afford the medication copay? Yes   Payment Method zero copay   Days supply of Refill 30   Supplies needed? No supplies needed   Refill activity completed? Yes   Refill activity plan Refill scheduled   Shipment/Pickup Date: 09/15/22            Current Outpatient Medications   Medication Sig    albuterol (PROVENTIL/VENTOLIN HFA) 90 mcg/actuation inhaler Inhale 2 puffs into the lungs every 6 (six) hours as needed for Wheezing. Rescue    albuterol-ipratropium (DUO-NEB) 2.5 mg-0.5 mg/3 mL nebulizer solution Take 3 mLs by nebulization every 6 (six) hours as needed for Wheezing. Rescue    amLODIPine (NORVASC) 5 MG tablet Take 1 tablet (5 mg  total) by mouth once daily.    ascorbic acid, vitamin C, (VITAMIN C) 500 MG tablet Take 500 mg by mouth.    aspirin 81 MG Chew Take 1 tablet (81 mg total) by mouth once daily.    entecavir (BARACLUDE) 0.5 MG Tab Take 1 tablet (0.5 mg) by mouth once daily.    levothyroxine (EUTHYROX) 50 MCG tablet Take 1 tablet (50 mcg total) by mouth before breakfast.    nebulizer and compressor Michelle Use nebulized for breathing treatment Q6hr prn for wheezing    pantoprazole (PROTONIX) 40 MG tablet Take 1 tablet (40 mg total) by mouth once daily.   Last reviewed on 9/1/2022  2:24 PM by Chidi Yoon MD    Review of patient's allergies indicates:   Allergen Reactions    Orange juice Rash    Last reviewed on  9/1/2022 2:24 PM by Chidi Yoon      Tasks added this encounter   10/11/2022 - Refill Call (Auto Added)   Tasks due within next 3 months   No tasks due.     Joi Edwards, PharmD  [facility]  [address] [place] [address]  Phone: [phone]  Fax: [phone]

## 2022-10-11 ENCOUNTER — SPECIALTY PHARMACY (OUTPATIENT)
Dept: PHARMACY | Facility: CLINIC | Age: 67
End: 2022-10-11
Payer: MEDICAID

## 2022-10-11 NOTE — TELEPHONE ENCOUNTER
Specialty Pharmacy - Refill Coordination    Specialty Medication Orders Linked to Encounter      Flowsheet Row Most Recent Value   Medication #1 entecavir (BARACLUDE) 0.5 MG Tab (Order#[id number], Rx#[id number])            Refill Questions - Documented Responses      Flowsheet Row Most Recent Value   Patient Availability and HIPAA Verification    Does patient want to proceed with activity? Yes   HIPAA/medical authority confirmed? Yes   Relationship to patient of person spoken to? Self   Refill Screening Questions    Changes to allergies? No   Changes to medications? No   New conditions since last clinic visit? No   Unplanned office visit, urgent care, ED, or hospital admission in the last 4 weeks? No   How does patient/caregiver feel medication is working? Good   Financial problems or insurance changes? No   Would patient like to speak to a pharmacist? No   When does the patient need to receive the medication? 10/16/22   Refill Delivery Questions    How will the patient receive the medication? Mail   When does the patient need to receive the medication? 10/16/22   Shipping Address Home   Address in [facility] confirmed and updated if neccessary? Yes   Expected Copay ($) 0   Is the patient able to afford the medication copay? Yes   Payment Method zero copay   Days supply of Refill 30   Supplies needed? No supplies needed   Refill activity completed? Yes   Refill activity plan Refill scheduled   Shipment/Pickup Date: 10/13/22            Current Outpatient Medications   Medication Sig    albuterol (PROVENTIL/VENTOLIN HFA) 90 mcg/actuation inhaler Inhale 2 puffs into the lungs every 6 (six) hours as needed for Wheezing. Rescue    albuterol-ipratropium (DUO-NEB) 2.5 mg-0.5 mg/3 mL nebulizer solution Take 3 mLs by nebulization every 6 (six) hours as needed for Wheezing. Rescue    amLODIPine (NORVASC) 5 MG tablet Take 1 tablet (5 mg total) by mouth once daily.    ascorbic acid, vitamin C, (VITAMIN C) 500 MG tablet  Take 500 mg by mouth.    aspirin 81 MG Chew Take 1 tablet (81 mg total) by mouth once daily.    entecavir (BARACLUDE) 0.5 MG Tab Take 1 tablet (0.5 mg) by mouth once daily.    levothyroxine (EUTHYROX) 50 MCG tablet Take 1 tablet (50 mcg total) by mouth before breakfast.    nebulizer and compressor Michelle Use nebulized for breathing treatment Q6hr prn for wheezing    pantoprazole (PROTONIX) 40 MG tablet Take 1 tablet (40 mg total) by mouth once daily.   Last reviewed on 9/15/2022  2:08 PM by Chidi Yoon MD    Review of patient's allergies indicates:   Allergen Reactions    Orange juice Rash    Last reviewed on  9/15/2022 2:08 PM by Chidi Yoon      Tasks added this encounter   11/8/2022 - Refill Call (Auto Added)   Tasks due within next 3 months   No tasks due.     [facility]ty [facility]  [address] [facility] [address]  Phone: [phone]  Fax: [phone]

## 2022-11-09 ENCOUNTER — SPECIALTY PHARMACY (OUTPATIENT)
Dept: PHARMACY | Facility: CLINIC | Age: 67
End: 2022-11-09
Payer: MEDICAID

## 2022-11-09 NOTE — TELEPHONE ENCOUNTER
Specialty Pharmacy - Refill Coordination    Specialty Medication Orders Linked to Encounter      Flowsheet Row Most Recent Value   Medication #1 entecavir (BARACLUDE) 0.5 MG Tab (Order#[id number], Rx#[id number])            Refill Questions - Documented Responses      Flowsheet Row Most Recent Value   Patient Availability and HIPAA Verification    Does patient want to proceed with activity? Yes   HIPAA/medical authority confirmed? Yes   Relationship to patient of person spoken to? Self   Refill Screening Questions    Changes to allergies? No   Changes to medications? No   New conditions since last clinic visit? No   Unplanned office visit, urgent care, ED, or hospital admission in the last 4 weeks? No   How does patient/caregiver feel medication is working? Good   Financial problems or insurance changes? No   How many doses of your specialty medications were missed in the last 4 weeks? 0   Would patient like to speak to a pharmacist? No   When does the patient need to receive the medication? 11/15/22   Refill Delivery Questions    How will the patient receive the medication? Mail   When does the patient need to receive the medication? 11/15/22   Shipping Address Home   Address in [facility] confirmed and updated if neccessary? Yes   Expected Copay ($) 0   Is the patient able to afford the medication copay? Yes   Payment Method zero copay   Days supply of Refill 30   Supplies needed? No supplies needed   Refill activity completed? Yes   Refill activity plan Refill scheduled   Shipment/Pickup Date: 11/14/22            Current Outpatient Medications   Medication Sig    albuterol (PROVENTIL/VENTOLIN HFA) 90 mcg/actuation inhaler Inhale 2 puffs into the lungs every 6 (six) hours as needed for Wheezing. Rescue    albuterol-ipratropium (DUO-NEB) 2.5 mg-0.5 mg/3 mL nebulizer solution Take 3 mLs by nebulization every 6 (six) hours as needed for Wheezing. Rescue    amLODIPine (NORVASC) 5 MG tablet Take 1 tablet (5 mg  total) by mouth once daily.    ascorbic acid, vitamin C, (VITAMIN C) 500 MG tablet Take 500 mg by mouth.    aspirin 81 MG Chew Take 1 tablet (81 mg total) by mouth once daily.    entecavir (BARACLUDE) 0.5 MG Tab Take 1 tablet (0.5 mg) by mouth once daily.    levothyroxine (EUTHYROX) 50 MCG tablet Take 1 tablet (50 mcg total) by mouth before breakfast.    nebulizer and compressor Michelle Use nebulized for breathing treatment Q6hr prn for wheezing    pantoprazole (PROTONIX) 40 MG tablet Take 1 tablet (40 mg total) by mouth once daily.   Last reviewed on 9/15/2022  2:08 PM by Chidi Yoon MD    Review of patient's allergies indicates:   Allergen Reactions    Orange juice Rash    Last reviewed on  10/17/2022 7:43 AM by Yelena Chappell      Tasks added this encounter   12/8/2022 - Refill Call (Auto Added)   Tasks due within next 3 months   No tasks due.     [facility] - Specialty Pharmacy  [address] [facility] [address]  Phone: [phone]  Fax: [phone]

## 2022-12-07 ENCOUNTER — PATIENT MESSAGE (OUTPATIENT)
Dept: PHARMACY | Facility: CLINIC | Age: 67
End: 2022-12-07
Payer: MEDICAID

## 2022-12-13 ENCOUNTER — SPECIALTY PHARMACY (OUTPATIENT)
Dept: PHARMACY | Facility: CLINIC | Age: 67
End: 2022-12-13
Payer: MEDICAID

## 2022-12-13 NOTE — TELEPHONE ENCOUNTER
Specialty Pharmacy - Refill Coordination    Specialty Medication Orders Linked to Encounter      Flowsheet Row Most Recent Value   Medication #1 entecavir (BARACLUDE) 0.5 MG Tab (Order#[id number], Rx#[id number])            Refill Questions - Documented Responses      Flowsheet Row Most Recent Value   Patient Availability and HIPAA Verification    Does patient want to proceed with activity? Yes   HIPAA/medical authority confirmed? Yes   Relationship to patient of person spoken to? Self   Refill Screening Questions    Changes to allergies? No   Changes to medications? No   New conditions since last clinic visit? No   Unplanned office visit, urgent care, ED, or hospital admission in the last 4 weeks? No   How does patient/caregiver feel medication is working? Good   Financial problems or insurance changes? No   How many doses of your specialty medications were missed in the last 4 weeks? 0   Would patient like to speak to a pharmacist? No   When does the patient need to receive the medication? 12/19/22   Refill Delivery Questions    How will the patient receive the medication? Mail   When does the patient need to receive the medication? 12/19/22   Shipping Address Home   Address in [facility] confirmed and updated if neccessary? Yes   Expected Copay ($) 0   Is the patient able to afford the medication copay? Yes   Payment Method zero copay   Days supply of Refill 30   Refill activity completed? Yes   Refill activity plan Refill scheduled   Shipment/Pickup Date: 12/15/22            Current Outpatient Medications   Medication Sig    albuterol (PROVENTIL/VENTOLIN HFA) 90 mcg/actuation inhaler Inhale 2 puffs into the lungs every 6 (six) hours as needed for Wheezing. Rescue    albuterol-ipratropium (DUO-NEB) 2.5 mg-0.5 mg/3 mL nebulizer solution Take 3 mLs by nebulization every 6 (six) hours as needed for Wheezing. Rescue    amLODIPine (NORVASC) 10 MG tablet Take 1 tablet (10 mg total) by mouth once daily.     ascorbic acid, vitamin C, (VITAMIN C) 500 MG tablet Take 500 mg by mouth.    aspirin 81 MG Chew Take 1 tablet (81 mg total) by mouth once daily.    entecavir (BARACLUDE) 0.5 MG Tab Take 1 tablet (0.5 mg) by mouth once daily.    levothyroxine (EUTHYROX) 50 MCG tablet Take 1 tablet (50 mcg total) by mouth before breakfast.    nebulizer and compressor Michelle Use nebulized for breathing treatment Q6hr prn for wheezing    pantoprazole (PROTONIX) 40 MG tablet Take 1 tablet (40 mg total) by mouth once daily.   Last reviewed on 12/8/2022 11:06 AM by Sigrid Soler RN    Review of patient's allergies indicates:   Allergen Reactions    Orange juice Rash    Last reviewed on  12/8/2022 11:06 AM by Sigrid Soler      Tasks added this encounter   1/11/2023 - Refill Call (Auto Added)   Tasks due within next 3 months   No tasks due.     [facility]  [address] [place] [address]  Phone: [phone]  Fax: [phone]

## 2023-01-11 ENCOUNTER — SPECIALTY PHARMACY (OUTPATIENT)
Dept: PHARMACY | Facility: CLINIC | Age: 68
End: 2023-01-11
Payer: MEDICAID

## 2023-01-11 NOTE — TELEPHONE ENCOUNTER
Specialty Pharmacy - Refill Coordination    Specialty Medication Orders Linked to Encounter      Flowsheet Row Most Recent Value   Medication #1 entecavir (BARACLUDE) 0.5 MG Tab (Order#[id number], Rx#[id number])            Refill Questions - Documented Responses      Flowsheet Row Most Recent Value   Patient Availability and HIPAA Verification    Does patient want to proceed with activity? Yes   HIPAA/medical authority confirmed? Yes   Relationship to patient of person spoken to? Self   Refill Screening Questions    Changes to allergies? No   Changes to medications? No   New conditions since last clinic visit? No   Unplanned office visit, urgent care, ED, or hospital admission in the last 4 weeks? No   How does patient/caregiver feel medication is working? Good   Financial problems or insurance changes? No   How many doses of your specialty medications were missed in the last 4 weeks? 0   Would patient like to speak to a pharmacist? No   When does the patient need to receive the medication? 01/18/23   Refill Delivery Questions    How will the patient receive the medication? Mail   When does the patient need to receive the medication? 01/18/23   Shipping Address Home   Address in [facility] confirmed and updated if neccessary? Yes   Expected Copay ($) 0   Is the patient able to afford the medication copay? Yes   Payment Method zero copay   Days supply of Refill 30   Refill activity completed? Yes   Refill activity plan Refill scheduled   Shipment/Pickup Date: 01/17/23            Current Outpatient Medications   Medication Sig    albuterol (PROVENTIL/VENTOLIN HFA) 90 mcg/actuation inhaler Inhale 2 puffs into the lungs every 6 (six) hours as needed for Wheezing. Rescue    albuterol-ipratropium (DUO-NEB) 2.5 mg-0.5 mg/3 mL nebulizer solution Take 3 mLs by nebulization every 6 (six) hours as needed for Wheezing. Rescue    amLODIPine (NORVASC) 10 MG tablet Take 1 tablet (10 mg total) by mouth once daily.     ascorbic acid, vitamin C, (VITAMIN C) 500 MG tablet Take 500 mg by mouth.    aspirin 81 MG Chew Take 1 tablet (81 mg total) by mouth once daily.    entecavir (BARACLUDE) 0.5 MG Tab Take 1 tablet (0.5 mg) by mouth once daily.    levothyroxine (EUTHYROX) 50 MCG tablet Take 1 tablet (50 mcg total) by mouth before breakfast.    nebulizer and compressor Michelle Use nebulized for breathing treatment Q6hr prn for wheezing    pantoprazole (PROTONIX) 40 MG tablet Take 1 tablet (40 mg total) by mouth once daily.   Last reviewed on 12/8/2022 11:06 AM by Sigrid Soler RN    Review of patient's allergies indicates:   Allergen Reactions    Orange juice Rash    Last reviewed on  12/8/2022 11:06 AM by Sigrid Soler      Tasks added this encounter   2/10/2023 - Refill Call (Auto Added)   Tasks due within next 3 months   No tasks due.     [facility]  [address] [place] [address]  Phone: [phone]  Fax: [phone]

## 2023-02-10 ENCOUNTER — SPECIALTY PHARMACY (OUTPATIENT)
Dept: PHARMACY | Facility: CLINIC | Age: 68
End: 2023-02-10
Payer: MEDICAID

## 2023-02-10 NOTE — TELEPHONE ENCOUNTER
Specialty Pharmacy - Refill Coordination    Specialty Medication Orders Linked to Encounter      Flowsheet Row Most Recent Value   Medication #1 entecavir (BARACLUDE) 0.5 MG Tab (Order#[id number], Rx#[id number])            Refill Questions - Documented Responses      Flowsheet Row Most Recent Value   Patient Availability and HIPAA Verification    Does patient want to proceed with activity? Yes   HIPAA/medical authority confirmed? Yes   Relationship to patient of person spoken to? Self   Refill Screening Questions    Changes to allergies? No   Changes to medications? No   New conditions since last clinic visit? No   Unplanned office visit, urgent care, ED, or hospital admission in the last 4 weeks? No   How does patient/caregiver feel medication is working? Good   Financial problems or insurance changes? No   How many doses of your specialty medications were missed in the last 4 weeks? 0   Would patient like to speak to a pharmacist? No   When does the patient need to receive the medication? 02/17/23   Refill Delivery Questions    How will the patient receive the medication? Mail   When does the patient need to receive the medication? 02/17/23   Shipping Address Home   Address in [facility] confirmed and updated if neccessary? Yes   Expected Copay ($) 0   Is the patient able to afford the medication copay? Yes   Payment Method zero copay   Days supply of Refill 30   Supplies needed? No supplies needed   Refill activity completed? Yes   Refill activity plan Refill scheduled   Shipment/Pickup Date: 02/13/23            Current Outpatient Medications   Medication Sig    albuterol (PROVENTIL/VENTOLIN HFA) 90 mcg/actuation inhaler Inhale 2 puffs into the lungs every 6 (six) hours as needed for Wheezing. Rescue    albuterol-ipratropium (DUO-NEB) 2.5 mg-0.5 mg/3 mL nebulizer solution Take 3 mLs by nebulization every 6 (six) hours as needed for Wheezing. Rescue    amLODIPine (NORVASC) 10 MG tablet Take 1 tablet (10 mg  total) by mouth once daily.    ascorbic acid, vitamin C, (VITAMIN C) 500 MG tablet Take 500 mg by mouth.    aspirin 81 MG Chew Take 1 tablet (81 mg total) by mouth once daily.    entecavir (BARACLUDE) 0.5 MG Tab Take 1 tablet (0.5 mg) by mouth once daily.    levothyroxine (EUTHYROX) 50 MCG tablet Take 1 tablet (50 mcg total) by mouth before breakfast.    nebulizer and compressor Michelle Use nebulized for breathing treatment Q6hr prn for wheezing    pantoprazole (PROTONIX) 40 MG tablet Take 1 tablet (40 mg total) by mouth once daily.   Last reviewed on 1/18/2023  9:32 AM by Na Mullins NP    Review of patient's allergies indicates:   Allergen Reactions    Orange juice Rash    Last reviewed on  2/2/2023 10:29 AM by Jackie Gonzalez      Tasks added this encounter   3/12/2023 - Refill Call (Auto Added)   Tasks due within next 3 months   No tasks due.     [facility]  [address] [facility] [address]  Phone: [phone]  Fax: [phone]

## 2023-03-15 ENCOUNTER — SPECIALTY PHARMACY (OUTPATIENT)
Dept: PHARMACY | Facility: CLINIC | Age: 68
End: 2023-03-15
Payer: MEDICAID

## 2023-03-15 NOTE — TELEPHONE ENCOUNTER
Specialty Pharmacy - Refill Coordination    Specialty Medication Orders Linked to Encounter      Flowsheet Row Most Recent Value   Medication #1 entecavir (BARACLUDE) 0.5 MG Tab (Order#[id number], Rx#[id number])            Refill Questions - Documented Responses      Flowsheet Row Most Recent Value   Patient Availability and HIPAA Verification    Does patient want to proceed with activity? Yes   HIPAA/medical authority confirmed? Yes   Relationship to patient of person spoken to? Self   Refill Screening Questions    Changes to allergies? No   Changes to medications? No   New conditions since last clinic visit? No   Unplanned office visit, urgent care, ED, or hospital admission in the last 4 weeks? No   How does patient/caregiver feel medication is working? Good   Financial problems or insurance changes? No   How many doses of your specialty medications were missed in the last 4 weeks? 0   Would patient like to speak to a pharmacist? No   When does the patient need to receive the medication? 03/24/23   Refill Delivery Questions    How will the patient receive the medication? Mail   When does the patient need to receive the medication? 03/24/23   Shipping Address Home   Address in [facility] confirmed and updated if neccessary? Yes   Expected Copay ($) 0   Is the patient able to afford the medication copay? Yes   Payment Method zero copay   Days supply of Refill 30   Supplies needed? No supplies needed   Refill activity completed? Yes   Refill activity plan Refill scheduled   Shipment/Pickup Date: 03/16/23            Current Outpatient Medications   Medication Sig    albuterol (PROVENTIL/VENTOLIN HFA) 90 mcg/actuation inhaler Inhale 2 puffs into the lungs every 6 (six) hours as needed for Wheezing. Rescue    albuterol-ipratropium (DUO-NEB) 2.5 mg-0.5 mg/3 mL nebulizer solution Take 3 mLs by nebulization every 6 (six) hours as needed for Wheezing. Rescue    amLODIPine (NORVASC) 10 MG tablet Take 1 tablet (10 mg  total) by mouth once daily.    ascorbic acid, vitamin C, (VITAMIN C) 500 MG tablet Take 500 mg by mouth.    aspirin 81 MG Chew Take 1 tablet (81 mg total) by mouth once daily.    entecavir (BARACLUDE) 0.5 MG Tab Take 1 tablet (0.5 mg) by mouth once daily.    levothyroxine (EUTHYROX) 50 MCG tablet Take 1 tablet (50 mcg total) by mouth before breakfast.    nebulizer and compressor Michelle Use nebulized for breathing treatment Q6hr prn for wheezing    pantoprazole (PROTONIX) 40 MG tablet Take 1 tablet (40 mg total) by mouth once daily.   Last reviewed on 1/18/2023  9:32 AM by Na Mullins NP    Review of patient's allergies indicates:   Allergen Reactions    Orange juice Rash    Last reviewed on  3/9/2023 2:15 PM by Cece Broussard      Tasks added this encounter   No tasks added.   Tasks due within next 3 months   3/12/2023 - Refill Call (Auto Added)     Lisbeth Coles, PharmD  [facility]  [address] [place], [place] [address]  [facility] [address]  Phone: [phone]  Fax: [phone]

## 2023-04-17 ENCOUNTER — PATIENT MESSAGE (OUTPATIENT)
Dept: PHARMACY | Facility: CLINIC | Age: 68
End: 2023-04-17
Payer: MEDICAID

## 2023-04-24 ENCOUNTER — SPECIALTY PHARMACY (OUTPATIENT)
Dept: PHARMACY | Facility: CLINIC | Age: 68
End: 2023-04-24
Payer: MEDICAID

## 2023-05-19 ENCOUNTER — SPECIALTY PHARMACY (OUTPATIENT)
Dept: PHARMACY | Facility: CLINIC | Age: 68
End: 2023-05-19
Payer: MEDICAID

## 2023-05-19 NOTE — TELEPHONE ENCOUNTER
Specialty Pharmacy - Refill Coordination    Specialty Medication Orders Linked to Encounter      Flowsheet Row Most Recent Value   Medication #1 entecavir (BARACLUDE) 0.5 MG Tab (Order#[id number], Rx#[id number])            Refill Questions - Documented Responses      Flowsheet Row Most Recent Value   Refill Screening Questions    Changes to allergies? No   Changes to medications? No   New conditions since last clinic visit? No   Unplanned office visit, urgent care, ED, or hospital admission in the last 4 weeks? No   How does patient/caregiver feel medication is working? Good   Financial problems or insurance changes? No   How many doses of your specialty medications were missed in the last 4 weeks? 0   Would patient like to speak to a pharmacist? No   When does the patient need to receive the medication? 05/26/23   Refill Delivery Questions    How will the patient receive the medication? Mail   When does the patient need to receive the medication? 05/26/23   Shipping Address Home   Address in [facility] confirmed and updated if neccessary? Yes   Expected Copay ($) 0   Is the patient able to afford the medication copay? Yes   Payment Method zero copay   Days supply of Refill 30   Supplies needed? No supplies needed   Refill activity completed? Yes   Refill activity plan Refill scheduled   Shipment/Pickup Date: 05/23/23            Current Outpatient Medications   Medication Sig    albuterol (PROVENTIL/VENTOLIN HFA) 90 mcg/actuation inhaler Inhale 2 puffs into the lungs every 6 (six) hours as needed for Wheezing. Rescue    albuterol-ipratropium (DUO-NEB) 2.5 mg-0.5 mg/3 mL nebulizer solution Take 3 mLs by nebulization every 6 (six) hours as needed for Wheezing. Rescue    amLODIPine (NORVASC) 10 MG tablet Take 1 tablet (10 mg total) by mouth once daily.    ascorbic acid, vitamin C, (VITAMIN C) 500 MG tablet Take 500 mg by mouth.    aspirin 81 MG Chew Take 1 tablet (81 mg total) by mouth once daily.    entecavir  (BARACLUDE) 0.5 MG Tab Take 1 tablet (0.5 mg) by mouth once daily.    levothyroxine (EUTHYROX) 50 MCG tablet Take 1 tablet (50 mcg total) by mouth before breakfast.    nebulizer and compressor Michelle Use nebulized for breathing treatment Q6hr prn for wheezing    pantoprazole (PROTONIX) 40 MG tablet Take 1 tablet (40 mg total) by mouth once daily.   Last reviewed on 1/18/2023  9:32 AM by Na Mullins NP    Review of patient's allergies indicates:   Allergen Reactions    Orange juice Rash    Last reviewed on  3/9/2023 2:15 PM by Cece Broussard      Tasks added this encounter   No tasks added.   Tasks due within next 3 months   5/21/2023 - Refill Coordination Outreach (1 time occurrence)     [facility]  [address]  [facility] [address]  Phone: [phone]  Fax: [phone]

## 2023-06-15 ENCOUNTER — PATIENT MESSAGE (OUTPATIENT)
Dept: PHARMACY | Facility: CLINIC | Age: 68
End: 2023-06-15
Payer: MEDICAID

## 2023-06-19 ENCOUNTER — SPECIALTY PHARMACY (OUTPATIENT)
Dept: PHARMACY | Facility: CLINIC | Age: 68
End: 2023-06-19
Payer: MEDICAID

## 2023-06-19 NOTE — TELEPHONE ENCOUNTER
Specialty Pharmacy - Refill Coordination    Specialty Medication Orders Linked to Encounter      Flowsheet Row Most Recent Value   Medication #1 entecavir (BARACLUDE) 0.5 MG Tab (Order#[id number], Rx#[id number])            Refill Questions - Documented Responses      Flowsheet Row Most Recent Value   Patient Availability and HIPAA Verification    Does patient want to proceed with activity? Yes   HIPAA/medical authority confirmed? Yes   Relationship to patient of person spoken to? Self   Refill Screening Questions    Changes to allergies? No   Changes to medications? No   New conditions since last clinic visit? No   Unplanned office visit, urgent care, ED, or hospital admission in the last 4 weeks? No   How does patient/caregiver feel medication is working? Good   Financial problems or insurance changes? No   How many doses of your specialty medications were missed in the last 4 weeks? 0   Would patient like to speak to a pharmacist? No   When does the patient need to receive the medication? 06/27/23   Refill Delivery Questions    How will the patient receive the medication? Mail   When does the patient need to receive the medication? 06/27/23   Shipping Address Home   Address in [facility] confirmed and updated if neccessary? Yes   Expected Copay ($) 0   Is the patient able to afford the medication copay? Yes   Payment Method zero copay   Days supply of Refill 30   Supplies needed? No supplies needed   Refill activity completed? Yes   Refill activity plan Refill scheduled   Shipment/Pickup Date: 06/22/23            Current Outpatient Medications   Medication Sig    albuterol (PROVENTIL/VENTOLIN HFA) 90 mcg/actuation inhaler Inhale 2 puffs into the lungs every 6 (six) hours as needed for Wheezing. Rescue    albuterol-ipratropium (DUO-NEB) 2.5 mg-0.5 mg/3 mL nebulizer solution Take 3 mLs by nebulization every 6 (six) hours as needed for Wheezing. Rescue    amLODIPine (NORVASC) 10 MG tablet Take 1 tablet (10 mg  total) by mouth once daily.    ascorbic acid, vitamin C, (VITAMIN C) 500 MG tablet Take 500 mg by mouth.    aspirin 81 MG Chew Take 1 tablet (81 mg total) by mouth once daily.    entecavir (BARACLUDE) 0.5 MG Tab Take 1 tablet (0.5 mg) by mouth once daily.    levothyroxine (EUTHYROX) 50 MCG tablet Take 1 tablet (50 mcg total) by mouth before breakfast.    nebulizer and compressor Michelle Use nebulized for breathing treatment Q6hr prn for wheezing    pantoprazole (PROTONIX) 40 MG tablet Take 1 tablet (40 mg total) by mouth once daily.   Last reviewed on 1/18/2023  9:32 AM by Na Mullins NP    Review of patient's allergies indicates:   Allergen Reactions    Orange juice Rash    Last reviewed on  3/9/2023 2:15 PM by Cece Broussard      Tasks added this encounter   No tasks added.   Tasks due within next 3 months   6/18/2023 - Refill Coordination Outreach (1 time occurrence)     [facility]  [address]  [facility] [address]  Phone: [phone]  Fax: [phone]

## 2023-07-20 ENCOUNTER — SPECIALTY PHARMACY (OUTPATIENT)
Dept: PHARMACY | Facility: CLINIC | Age: 68
End: 2023-07-20
Payer: MEDICAID

## 2023-07-20 NOTE — TELEPHONE ENCOUNTER
Specialty Pharmacy - Refill Coordination    Specialty Medication Orders Linked to Encounter      Flowsheet Row Most Recent Value   Medication #1 entecavir (BARACLUDE) 0.5 MG Tab (Order#[id number], Rx#[id number])            Refill Questions - Documented Responses      Flowsheet Row Most Recent Value   Refill Screening Questions    Changes to allergies? No   Changes to medications? No   New conditions since last clinic visit? No   Unplanned office visit, urgent care, ED, or hospital admission in the last 4 weeks? No   How does patient/caregiver feel medication is working? Good   Financial problems or insurance changes? No   How many doses of your specialty medications were missed in the last 4 weeks? 0   Would patient like to speak to a pharmacist? No   When does the patient need to receive the medication? 07/26/23   Refill Delivery Questions    How will the patient receive the medication? Mail   When does the patient need to receive the medication? 07/26/23   Shipping Address Home   Address in [facility] confirmed and updated if neccessary? Yes   Expected Copay ($) 0   Is the patient able to afford the medication copay? Yes   Payment Method zero copay   Days supply of Refill 30   Supplies needed? No supplies needed   Refill activity completed? Yes   Refill activity plan Refill scheduled   Shipment/Pickup Date: 07/24/23            Current Outpatient Medications   Medication Sig    albuterol (PROVENTIL/VENTOLIN HFA) 90 mcg/actuation inhaler Inhale 2 puffs into the lungs every 6 (six) hours as needed for Wheezing. Rescue    albuterol-ipratropium (DUO-NEB) 2.5 mg-0.5 mg/3 mL nebulizer solution Take 3 mLs by nebulization every 6 (six) hours as needed for Wheezing. Rescue    amLODIPine (NORVASC) 10 MG tablet Take 1 tablet (10 mg total) by mouth once daily.    ascorbic acid, vitamin C, (VITAMIN C) 500 MG tablet Take 500 mg by mouth.    aspirin 81 MG Chew Take 1 tablet (81 mg total) by mouth once daily.    entecavir  (BARACLUDE) 0.5 MG Tab Take 1 tablet (0.5 mg) by mouth once daily.    levothyroxine (EUTHYROX) 50 MCG tablet Take 1 tablet (50 mcg total) by mouth before breakfast.    nebulizer and compressor Michelle Use nebulized for breathing treatment Q6hr prn for wheezing    pantoprazole (PROTONIX) 40 MG tablet Take 1 tablet (40 mg total) by mouth once daily.   Last reviewed on 1/18/2023  9:32 AM by Na Mullins NP    Review of patient's allergies indicates:   Allergen Reactions    Orange juice Rash    Last reviewed on  3/9/2023 2:15 PM by Cece Broussard      Tasks added this encounter   No tasks added.   Tasks due within next 3 months   7/20/2023 - Refill Coordination Outreach (1 time occurrence)     [facility]  [address]  [facility] [address]  Phone: [phone]  Fax: [phone]

## 2023-08-16 ENCOUNTER — SPECIALTY PHARMACY (OUTPATIENT)
Dept: PHARMACY | Facility: CLINIC | Age: 68
End: 2023-08-16
Payer: MEDICAID

## 2023-08-16 NOTE — TELEPHONE ENCOUNTER
Specialty Pharmacy - Refill Coordination    Specialty Medication Orders Linked to Encounter      Flowsheet Row Most Recent Value   Medication #1 entecavir (BARACLUDE) 0.5 MG Tab (Order#[id number], Rx#[id number])            Refill Questions - Documented Responses      Flowsheet Row Most Recent Value   Patient Availability and HIPAA Verification    Does patient want to proceed with activity? Yes   HIPAA/medical authority confirmed? Yes   Relationship to patient of person spoken to? Self   Refill Screening Questions    Changes to allergies? No   Changes to medications? No   New conditions since last clinic visit? No   Unplanned office visit, urgent care, ED, or hospital admission in the last 4 weeks? No   How does patient/caregiver feel medication is working? Good   Financial problems or insurance changes? No   How many doses of your specialty medications were missed in the last 4 weeks? 0   Would patient like to speak to a pharmacist? No   When does the patient need to receive the medication? 08/24/23   Refill Delivery Questions    How will the patient receive the medication? Mail   When does the patient need to receive the medication? 08/24/23   Shipping Address Home   Address in [facility] confirmed and updated if neccessary? Yes   Expected Copay ($) 0   Is the patient able to afford the medication copay? Yes   Payment Method zero copay   Days supply of Refill 30   Refill activity completed? Yes   Refill activity plan Refill scheduled   Shipment/Pickup Date: 08/22/23            Current Outpatient Medications   Medication Sig    albuterol (PROVENTIL/VENTOLIN HFA) 90 mcg/actuation inhaler Inhale 2 puffs into the lungs every 6 (six) hours as needed for Wheezing. Rescue    albuterol-ipratropium (DUO-NEB) 2.5 mg-0.5 mg/3 mL nebulizer solution Take 3 mLs by nebulization every 6 (six) hours as needed for Wheezing. Rescue    amLODIPine (NORVASC) 10 MG tablet Take 1 tablet (10 mg total) by mouth once daily.     ascorbic acid, vitamin C, (VITAMIN C) 500 MG tablet Take 500 mg by mouth.    aspirin 81 MG Chew Take 1 tablet (81 mg total) by mouth once daily.    atorvastatin (LIPITOR) 20 MG tablet Take 1 tablet (20 mg total) by mouth once daily.    entecavir (BARACLUDE) 0.5 MG Tab Take 1 tablet (0.5 mg) by mouth once daily.    levothyroxine (EUTHYROX) 50 MCG tablet Take 1 tablet (50 mcg total) by mouth before breakfast.    nebulizer and compressor Michelle Use nebulized for breathing treatment Q6hr prn for wheezing    pantoprazole (PROTONIX) 40 MG tablet Take 1 tablet (40 mg total) by mouth once daily.   Last reviewed on 8/10/2023  2:07 PM by Az Yepez MD    Review of patient's allergies indicates:   Allergen Reactions    Orange juice Rash    Last reviewed on  8/10/2023 2:07 PM by Az Yepez      Tasks added this encounter   No tasks added.   Tasks due within next 3 months   No tasks due.     [facility]  [address] [place] [address]  Phone: [phone]  Fax: [phone]

## 2024-01-05 PROBLEM — Z51.5 PALLIATIVE CARE BY SPECIALIST: Status: ACTIVE | Noted: 2024-01-05

## 2024-01-05 PROBLEM — Z00.00 HEALTHCARE MAINTENANCE: Status: ACTIVE | Noted: 2020-01-23

## 2024-01-05 PROBLEM — J45.901 REACTIVE AIRWAY DISEASE WITH WHEEZING WITH ACUTE EXACERBATION: Status: ACTIVE | Noted: 2024-01-05

## 2024-04-08 PROBLEM — Z00.00 HEALTHCARE MAINTENANCE: Status: RESOLVED | Noted: 2020-01-23 | Resolved: 2024-04-08

## 2024-06-12 PROBLEM — R05.9 COUGH: Status: RESOLVED | Noted: 2021-07-07 | Resolved: 2024-06-12

## 2024-08-29 DIAGNOSIS — U07.1 COVID-19 VIRUS DETECTED: ICD-10-CM

## 2024-08-29 PROBLEM — Z86.73 H/O TIA (TRANSIENT ISCHEMIC ATTACK) AND STROKE: Status: ACTIVE | Noted: 2024-08-29
